# Patient Record
Sex: MALE | Race: WHITE | NOT HISPANIC OR LATINO | Employment: FULL TIME | URBAN - METROPOLITAN AREA
[De-identification: names, ages, dates, MRNs, and addresses within clinical notes are randomized per-mention and may not be internally consistent; named-entity substitution may affect disease eponyms.]

---

## 2019-08-06 ENCOUNTER — OFFICE VISIT (OUTPATIENT)
Dept: URGENT CARE | Facility: CLINIC | Age: 49
End: 2019-08-06
Payer: COMMERCIAL

## 2019-08-06 VITALS
WEIGHT: 189.4 LBS | HEART RATE: 82 BPM | TEMPERATURE: 97.3 F | SYSTOLIC BLOOD PRESSURE: 122 MMHG | DIASTOLIC BLOOD PRESSURE: 80 MMHG | BODY MASS INDEX: 28.7 KG/M2 | HEIGHT: 68 IN | OXYGEN SATURATION: 96 %
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
## 2019-08-06 PROCEDURE — 99203 OFFICE O/P NEW LOW 30 MIN: CPT | Performed by: NURSE PRACTITIONER

## 2019-08-06 RX ORDER — DOXYCYCLINE HYCLATE 100 MG/1
100 CAPSULE ORAL 2 TIMES DAILY
Qty: 20 CAP | Refills: 0 | Status: SHIPPED | OUTPATIENT
Start: 2019-08-06 | End: 2019-08-16

## 2019-08-06 RX ORDER — IBUPROFEN 200 MG
200 TABLET ORAL
COMMUNITY

## 2019-08-06 ASSESSMENT — ENCOUNTER SYMPTOMS
NAUSEA: 0
CHILLS: 0
TINGLING: 0
DIZZINESS: 0
FEVER: 0

## 2019-08-06 ASSESSMENT — LIFESTYLE VARIABLES: SUBSTANCE_ABUSE: 0

## 2019-08-06 NOTE — PATIENT INSTRUCTIONS
Deer Tick Bite  Deer ticks are brown arachnids (spider family) that vary in size from as small as the head of a pin to 1/4 inch (1/2 cm) diameter. They thrive in wooded areas. Deer are the preferred host of adult deer ticks. Small rodents are the host of young ticks (nymphs). When a person walks in a field or wooded area, young and adult ticks in the surrounding grass and vegetation can attach themselves to the skin. They can suck blood for hours to days if unnoticed. Ticks are found all over the [place]  Some ticks carry a specific bacteria (Borrelia burgdorferi) that causes an infection called Lyme disease. The bacteria is typically passed into a person during the blood sucking process. This happens after the tick has been attached for at least a number of hours. While ticks can be found all over the [place] those carrying the bacteria that causes Lyme disease are most common in [place] and the [place]. Only a small proportion of ticks in these areas carry the Lyme disease bacteria and cause human infections.  Ticks usually attach to warm spots on the body, such as the:  · Head.  · Back.  · Neck.  · Armpits.  · Groin.  SYMPTOMS   Most of the time, a deer tick bite will not be felt. You may or may not see the attached tick. You may notice mild irritation or redness around the bite site. If the deer tick passes the Lyme disease bacteria to a person, a round, red rash may be noticed 2 to 3 days after the bite. The rash may be clear in the middle, like a bull's-eye or target.  If not treated, other symptoms may develop several days to weeks after the onset of the rash. These symptoms may include:  · New rash lesions.  · Fatigue and weakness.  · General ill feeling and achiness.  · Chills.  · Headache and neck pain.  · Swollen lymph glands.  · Sore muscles and joints.  5 to 15% of untreated people with Lyme disease may develop more severe illnesses after several weeks to months. This may include inflammation of the  brain lining (meningitis), nerve palsies, an abnormal heartbeat, or severe muscle and joint pain and inflammation (myositis or arthritis).  DIAGNOSIS   · Physical exam and medical history.  · Viewing the tick if it was saved for confirmation.  · Blood tests (to check or confirm the presence of Lyme disease).  TREATMENT   Most ticks do not carry disease. If found, an attached tick should be removed using tweezers. Tweezers should be placed under the body of the tick so it is removed by its attachment parts (pincers).  If there are signs or symptoms of being sick, or Lyme disease is confirmed, medicines (antibiotics) that kill germs are usually prescribed. In more severe cases, antibiotics may be given through an intravenous (IV) access.  HOME CARE INSTRUCTIONS   · Always remove ticks with tweezers. Do not use petroleum jelly or other methods to kill or remove the tick. Slide the tweezers under the body and pull out as much as you can. If you are not sure what it is, save it in a jar and show your caregiver.  · Once you remove the tick, the skin will heal on its own. Wash your hands and the affected area with water and soap. You may place a bandage on the affected area.  · Take medicine as directed. You may be advised to take a full course of antibiotics.  · Follow up with your caregiver as recommended.  FINDING OUT THE RESULTS OF YOUR TEST  Not all test results are available during your visit. If your test results are not back during the visit, make an appointment with your caregiver to find out the results. Do not assume everything is normal if you have not heard from your caregiver or the medical facility. It is important for you to follow up on all of your test results.  PROGNOSIS   If Lyme disease is confirmed, early treatment with antibiotics is very effective. Following preventive guidelines is important since it is possible to get the disease more than once.  PREVENTION   · Wear long sleeves and long pants in  wooded or grassy areas. Tuck your pants into your socks.  · Use an insect repellent while hiking.  · Check yourself, your children, and your pets regularly for ticks after playing outside.  · Clear piles of leaves or brush from your yard. Ticks might live there.  SEEK MEDICAL CARE IF:   · You or your child has an oral temperature above 102° F (38.9° C).  · You develop a severe headache following the bite.  · You feel generally ill.  · You notice a rash.  · You are having trouble removing the tick.  · The bite area has red skin or yellow drainage.  SEEK IMMEDIATE MEDICAL CARE IF:   · Your face is weak and droopy or you have other neurological symptoms.  · You have severe joint pain or weakness.  MAKE SURE YOU:   · Understand these instructions.  · Will watch your condition.  · Will get help right away if you are not doing well or get worse.  FOR MORE INFORMATION  Centers for Disease Control and Prevention: www.cdc.gov  American Academy of Family Physicians: www.aafp.org  Document Released: 03/14/2011 Document Revised: 03/11/2013 Document Reviewed: 03/14/2011  [employer]® Patient Information ©2014 [employer].      Lyme Disease  Lyme disease is an infection that affects many parts of the body, including the skin, joints, and nervous system. It is a bacterial infection that starts from the bite of an infected tick. The infection can spread, and some of the symptoms are similar to the flu. If Lyme disease is not treated, it may cause joint pain, swelling, numbness, problems thinking, fatigue, muscle weakness, and other problems.  What are the causes?  This condition is caused by bacteria called Borrelia burgdorferi.  You can get Lyme disease by being bitten by an infected tick. The tick must be attached to your skin to pass along the infection. Range often carry infected ticks.  What increases the risk?  The following factors may make you more likely to develop this condition:  · Living in or visiting these areas in the  [place]  ¨ [place].  ¨ [place].  ¨ [place].  · Spending time in wooded or grassy areas.  · Being outdoors with exposed skin.  · Camping, gardening, hiking, fishing, or hunting outdoors.  · Failing to remove a tick from your skin within 3-4 days.  What are the signs or symptoms?  Symptoms of this condition include:  · A round, red rash that surrounds the center of the tick bite. This is the first sign of infection. The center of the rash may be blood colored or have tiny blisters.  · Fatigue.  · Headache.  · Chills and fever.  · General achiness.  · Joint pain, often in the knees.  · Muscle pain.  · Swollen lymph glands.  · Stiff neck.  How is this diagnosed?  This condition is diagnosed based on:  · Your symptoms and medical history.  · A physical exam.  · A blood test.  How is this treated?  The main treatment for this condition is antibiotic medicine, which is usually taken by mouth (orally). The length of treatment depends on how soon after a tick bite you begin taking the medicine. In some cases, treatment is necessary for several weeks. If the infection is severe, antibiotics may need to be given through an IV tube that is inserted into one of your veins.  Follow these instructions at home:  · Take your antibiotic medicine as told by your health care provider. Do not stop taking the antibiotic even if you start to feel better.  · Ask your health care provider about taking a probiotic in between doses of your antibiotic to help avoid stomach upset or diarrhea.  · Check with your health care provider before supplementing your treatment. Many alternative therapies have not been proven and may be harmful to you.  · Keep all follow-up visits as told by your health care provider. This is important.  How is this prevented?  You can become reinfected if you get another tick bite from an infected tick. Take these steps to help prevent an infection:  · Cover your skin with light-colored  clothing when you are outdoors in the spring and summer months.  · Spray clothing and skin with bug spray. The spray should be 20-30% DEET.  · Avoid wooded, grassy, and shaded areas.  · Remove yard litter, brush, trash, and plants that attract deer and rodents.  · Check yourself for ticks when you come indoors.  · Wash clothing worn each day.  · Check your pets for ticks before they come inside.  · If you find a tick:  ¨ Remove it with tweezers.  ¨ Clean your hands and the bite area with rubbing alcohol or soap and water.  Pregnant women should take special care to avoid tick bites because the infection can be passed along to the fetus.  Contact a health care provider if:  · You have symptoms after treatment.  · You have removed a tick and want to bring it to your health care provider for testing.  Get help right away if:  · You have an irregular heartbeat.  · You have nerve pain.  · Your face feels numb.  This information is not intended to replace advice given to you by your health care provider. Make sure you discuss any questions you have with your health care provider.  Document Released: 03/26/2002 Document Revised: 08/08/2017 Document Reviewed: 08/08/2017  Elsevier Interactive Patient Education © 2017 [employer]

## 2019-08-06 NOTE — PROGRESS NOTES
"Subjective:      Jame Pickering is a 49 y.o. male who presents with Tick Removal (tick removed in shower this am. )    Reviewed past medical, surgical and family history. Reviewed prescription and OTC medications with patient in electronic health record today.     Allergies   Allergen Reactions   • Phenobarbital      Unknown reaction             HPI This is a new problem.  Tick removed from his back today while he was in the shower ( entire tick with head- has a picture). Tick was fully engorged. He thinks is was a deer tick. There are multiple deer in the areas where he was hiking. He has been hiking very frequently in the [facility]. Is very concerned about the possibility of lyme disease. No other aggravating or alleviating factors.            Review of Systems   Constitutional: Negative for chills and fever.   Gastrointestinal: Negative for nausea.   Neurological: Negative for dizziness and tingling.   Psychiatric/Behavioral: Negative for substance abuse.          Objective:     /80 (BP Location: Right arm, Patient Position: Sitting)   Pulse 82   Temp 36.3 °C (97.3 °F) (Temporal)   Ht 1.715 m (5' 7.5\")   Wt 85.9 kg (189 lb 6.4 oz)   SpO2 96%   BMI 29.23 kg/m²      Physical Exam   Constitutional: He is oriented to person, place, and time. Vital signs are normal. He appears well-developed and well-nourished. He is cooperative.  Non-toxic appearance. He does not have a sickly appearance. He does not appear ill.   HENT:   Head: Normocephalic.   Eyes: Pupils are equal, round, and reactive to light.   Neck: Normal range of motion. Neck supple.   Pulmonary/Chest: Effort normal.   Neurological: He is alert and oriented to person, place, and time.   Skin: Skin is warm. Capillary refill takes less than 2 seconds.   Psychiatric: He has a normal mood and affect. His behavior is normal. Judgment and thought content normal.   Nursing note and vitals reviewed.              Assessment/Plan: "     1. Tick bite, initial encounter    - doxycycline (VIBRAMYCIN) 100 MG Cap; Take 1 Cap by mouth 2 times a day for 10 days.  Dispense: 20 Cap; Refill: 0      Reviewed CDC guidelines and endemic areas of lyme disease with pt.   Reviewed s/s lyme disease.   Pt would like to be on prophylactic antibiotic.   Return to clinic or PCP  if current symptoms are not resolving in a satisfactory manner or sooner if new or worsening symptoms occur.   Patient was advised of signs and symptoms which would warrant further evaluation.  Verbalized agreement with this treatment plan and seemed to understand without barriers. Questions were encouraged and answered to patients satisfaction.

## 2020-01-10 ENCOUNTER — OFFICE VISIT (OUTPATIENT)
Dept: URGENT CARE | Facility: CLINIC | Age: 50
End: 2020-01-10
Payer: COMMERCIAL

## 2020-01-10 VITALS
TEMPERATURE: 97.6 F | BODY MASS INDEX: 28.79 KG/M2 | RESPIRATION RATE: 16 BRPM | WEIGHT: 190 LBS | HEART RATE: 98 BPM | SYSTOLIC BLOOD PRESSURE: 136 MMHG | DIASTOLIC BLOOD PRESSURE: 98 MMHG | OXYGEN SATURATION: 98 % | HEIGHT: 68 IN

## 2020-01-10 DIAGNOSIS — R09.81 SINUS CONGESTION: ICD-10-CM

## 2020-01-10 PROCEDURE — 99214 OFFICE O/P EST MOD 30 MIN: CPT | Performed by: PHYSICIAN ASSISTANT

## 2020-01-10 RX ORDER — DIPHENHYDRAMINE HCL 25 MG
25 CAPSULE ORAL
COMMUNITY

## 2020-01-10 RX ORDER — GUAIFENESIN 600 MG/1
1200 TABLET, EXTENDED RELEASE ORAL
COMMUNITY

## 2020-01-10 RX ORDER — CETIRIZINE HYDROCHLORIDE, PSEUDOEPHEDRINE HYDROCHLORIDE 5; 120 MG/1; MG/1
1 TABLET, FILM COATED, EXTENDED RELEASE ORAL 2 TIMES DAILY
Qty: 28 TAB | Refills: 0 | Status: SHIPPED | OUTPATIENT
Start: 2020-01-10 | End: 2020-01-24

## 2020-01-10 RX ORDER — DOXYCYCLINE HYCLATE 100 MG
100 TABLET ORAL 2 TIMES DAILY
Qty: 14 TAB | Refills: 0 | Status: SHIPPED | OUTPATIENT
Start: 2020-01-10 | End: 2020-01-17

## 2020-01-10 ASSESSMENT — ENCOUNTER SYMPTOMS
WHEEZING: 0
ABDOMINAL PAIN: 0
SHORTNESS OF BREATH: 0
COUGH: 1
FEVER: 0
VOMITING: 0
SORE THROAT: 0
SPUTUM PRODUCTION: 1
SINUS PAIN: 1
CHILLS: 0
NAUSEA: 0
DIARRHEA: 0

## 2020-01-10 NOTE — PROGRESS NOTES
"Subjective:   Jame Pickering  is a 50 y.o. male who presents for Sinus Pain (cough, congestion x1 day )        Sinus Pain   This is a new problem. The current episode started today. Associated symptoms include congestion and coughing. Pertinent negatives include no abdominal pain, chills, fever, nausea, rash, sore throat or vomiting.     Patient comes in clinic out of concern for sinusitis.  He notes sinus pressure pain on bilateral maxillary sinuses times last 1 to 2 days.  Notes some cough and significant congestion.  He notes specifically left nostril with what he feels to be impacted with no ability to move air.  Patient denies fevers chills.  Complains of cough is waxing waning between dry and productive, complains of some irritation throat.  Notes fullness to ears without pain.  Denies nausea vomiting abdominal pain diarrhea or rash.  Notes past medical history of sinusitis and bronchitis.  Denies history of asthma or pneumonia.    Review of Systems   Constitutional: Negative for chills and fever.   HENT: Positive for congestion and sinus pain. Negative for ear pain and sore throat.    Respiratory: Positive for cough and sputum production. Negative for shortness of breath and wheezing.    Gastrointestinal: Negative for abdominal pain, diarrhea, nausea and vomiting.   Skin: Negative for rash.     Allergies   Allergen Reactions   • Phenobarbital      Unknown reaction   I have worn a mask for the entire encounter with this patient.    Objective:   /98   Pulse 98   Temp 36.4 °C (97.6 °F) (Temporal)   Resp 16   Ht 1.727 m (5' 8\")   Wt 86.2 kg (190 lb)   SpO2 98%   BMI 28.89 kg/m²   Physical Exam  Vitals signs and nursing note reviewed.   Constitutional:       General: He is not in acute distress.     Appearance: He is well-developed. He is not diaphoretic.   HENT:      Head: Normocephalic and atraumatic.      Right Ear: Ear canal and external ear normal. Tympanic membrane is bulging. Tympanic " membrane is not erythematous.      Left Ear: Ear canal and external ear normal. Tympanic membrane is bulging. Tympanic membrane is not erythematous.      Nose:      Right Sinus: Maxillary sinus tenderness present. No frontal sinus tenderness.      Left Sinus: Maxillary sinus tenderness present. No frontal sinus tenderness.      Mouth/Throat:      Pharynx: Uvula midline. Posterior oropharyngeal erythema ( PND) present. No oropharyngeal exudate.      Tonsils: No tonsillar abscesses.   Eyes:      General: No scleral icterus.        Right eye: No discharge.         Left eye: No discharge.      Conjunctiva/sclera: Conjunctivae normal.   Neck:      Musculoskeletal: Neck supple.   Pulmonary:      Effort: Pulmonary effort is normal. No respiratory distress.      Breath sounds: No decreased breath sounds, wheezing, rhonchi or rales.   Musculoskeletal: Normal range of motion.   Lymphadenopathy:      Cervical: Cervical adenopathy ( mild bilat) present.   Skin:     General: Skin is warm and dry.      Coloration: Skin is not pale.   Neurological:      Mental Status: He is alert and oriented to person, place, and time.      Coordination: Coordination normal.           Assessment/Plan:   1. Sinus congestion  - cetirizine-psuedoephedrine (ZYRTEC-D) 5-120 MG per tablet; Take 1 Tab by mouth 2 times a day for 14 days.  Dispense: 28 Tab; Refill: 0  - doxycycline (VIBRAMYCIN) 100 MG Tab; Take 1 Tab by mouth 2 times a day for 7 days.  Dispense: 14 Tab; Refill: 0    Other orders  - diphenhydrAMINE (BENADRYL) 25 MG capsule; Take 25 mg by mouth.  - guaiFENesin ER (MUCINEX) 600 MG TABLET SR 12 HR; Take 1,200 mg by mouth.  Supportive care is reviewed with patient/caregiver - recommend to push PO fluids and electrolytes, Nsaids/tylenol, netti pot/saline irrig, humidifier in home, flonase, ponaris, antihistamine w/ decongestant -discussed with patient is early for me to determine this is a bacterial sinusitis-sent with contingent antibiotic  prescription to fill and complete with persistent symptoms despite supportive care -- Contingent antibiotic prescription given to patient to fill upon meeting criteria of guidelines discussed.   If filling,  take full course of Rx, take with probiotics, observe for resolution  Return to clinic with lack of resolution or progression of symptoms.    (Patient reports was never treated with doxycycline as he never filled the medication inquires about possibility of tick exposure-we will opt for that medication for sinusitis coverage today)  Differential diagnosis, natural history, supportive care, and indications for immediate follow-up discussed.

## 2020-10-31 ENCOUNTER — HOSPITAL ENCOUNTER (OUTPATIENT)
Dept: LAB | Facility: MEDICAL CENTER | Age: 50
End: 2020-10-31
Attending: ANESTHESIOLOGY
Payer: COMMERCIAL

## 2020-10-31 LAB — COVID ORDER STATUS COVID19: NORMAL

## 2020-10-31 PROCEDURE — U0003 INFECTIOUS AGENT DETECTION BY NUCLEIC ACID (DNA OR RNA); SEVERE ACUTE RESPIRATORY SYNDROME CORONAVIRUS 2 (SARS-COV-2) (CORONAVIRUS DISEASE [COVID-19]), AMPLIFIED PROBE TECHNIQUE, MAKING USE OF HIGH THROUGHPUT TECHNOLOGIES AS DESCRIBED BY CMS-2020-01-R: HCPCS

## 2020-10-31 PROCEDURE — C9803 HOPD COVID-19 SPEC COLLECT: HCPCS

## 2020-11-01 LAB
SARS-COV-2 RNA RESP QL NAA+PROBE: NOTDETECTED
SPECIMEN SOURCE: NORMAL

## 2023-12-21 ENCOUNTER — APPOINTMENT (OUTPATIENT)
Dept: URGENT CARE | Facility: PHYSICIAN GROUP | Age: 53
End: 2023-12-21
Payer: COMMERCIAL